# Patient Record
Sex: FEMALE | Race: WHITE | NOT HISPANIC OR LATINO | Employment: UNEMPLOYED | URBAN - NONMETROPOLITAN AREA
[De-identification: names, ages, dates, MRNs, and addresses within clinical notes are randomized per-mention and may not be internally consistent; named-entity substitution may affect disease eponyms.]

---

## 2017-02-06 ENCOUNTER — OFFICE VISIT (OUTPATIENT)
Dept: OBSTETRICS AND GYNECOLOGY | Facility: CLINIC | Age: 47
End: 2017-02-06

## 2017-02-06 VITALS
WEIGHT: 133 LBS | HEIGHT: 65 IN | DIASTOLIC BLOOD PRESSURE: 70 MMHG | SYSTOLIC BLOOD PRESSURE: 100 MMHG | BODY MASS INDEX: 22.16 KG/M2

## 2017-02-06 DIAGNOSIS — J30.89 ENVIRONMENTAL AND SEASONAL ALLERGIES: ICD-10-CM

## 2017-02-06 DIAGNOSIS — Z01.419 VISIT FOR GYNECOLOGIC EXAMINATION: Primary | ICD-10-CM

## 2017-02-06 PROCEDURE — 99396 PREV VISIT EST AGE 40-64: CPT | Performed by: NURSE PRACTITIONER

## 2017-02-06 RX ORDER — AMOXICILLIN 500 MG/1
500 CAPSULE ORAL 2 TIMES DAILY
Qty: 20 CAPSULE | Refills: 0 | Status: SHIPPED | OUTPATIENT
Start: 2017-02-06 | End: 2017-02-16

## 2017-02-06 NOTE — PROGRESS NOTES
Subjective   Alanna Sun is a 46 y.o. female is here today as a self referral.    HPI Comments: I'm here to have a physical and to see if I have the flu.    Gynecologic Exam   Associated symptoms include chills, a fever and a sore throat.       The following portions of the patient's history were reviewed and updated as appropriate: allergies, current medications, past family history, past medical history, past surgical history and problem list.    Review of Systems   Constitutional: Positive for chills, fatigue and fever.   HENT: Positive for sinus pressure and sore throat.    Musculoskeletal: Positive for arthralgias.       Objective   Physical Exam   Constitutional: She is oriented to person, place, and time. She appears well-developed and well-nourished.   HENT:   Head: Normocephalic.   Right Ear: External ear normal. There is drainage.   Left Ear: External ear normal. There is drainage.   Nose: Nose normal.   Mouth/Throat: Posterior oropharyngeal erythema present.   Eyes: Conjunctivae are normal. Pupils are equal, round, and reactive to light.   Neck: Normal range of motion. Neck supple. Carotid bruit is not present. No thyromegaly present.   Cardiovascular: Regular rhythm, normal heart sounds and intact distal pulses.    No murmur heard.  Pulmonary/Chest: Effort normal and breath sounds normal. No respiratory distress. Right breast exhibits no inverted nipple, no mass, no nipple discharge, no skin change and no tenderness. Left breast exhibits no inverted nipple, no mass, no nipple discharge, no skin change and no tenderness. Breasts are symmetrical. There is no breast swelling.   Abdominal: Soft. Bowel sounds are normal. She exhibits no distension and no mass. There is no tenderness. There is no guarding. No hernia. Hernia confirmed negative in the right inguinal area and confirmed negative in the left inguinal area.   Genitourinary: Vagina normal. Rectal exam shows no external hemorrhoid, no internal  hemorrhoid, no fissure, no mass, no tenderness and anal tone normal. No breast tenderness, discharge or bleeding. There is no rash, tenderness, lesion or injury on the right labia. There is no rash, tenderness, lesion or injury on the left labia. No vaginal discharge found.   Genitourinary Comments: Cervix, Uterus and Adnexa are surgically absent.  Urethra and urethral meatus normal  Bladder, normal no prolapse  Perineum and anus examined and without lesions   Musculoskeletal: Normal range of motion. She exhibits no edema or tenderness.   Lymphadenopathy:        Head (right side): No submental, no submandibular, no tonsillar, no preauricular, no posterior auricular and no occipital adenopathy present.        Head (left side): No submental, no submandibular, no tonsillar, no preauricular, no posterior auricular and no occipital adenopathy present.     She has no cervical adenopathy.        Right cervical: No superficial cervical, no deep cervical and no posterior cervical adenopathy present.       Left cervical: No superficial cervical, no deep cervical and no posterior cervical adenopathy present.     She has no axillary adenopathy.        Right: No inguinal adenopathy present.        Left: No inguinal adenopathy present.   Neurological: She is alert and oriented to person, place, and time. Coordination normal.   Skin: Skin is warm and dry. No bruising and no rash noted. No erythema.   Psychiatric: She has a normal mood and affect. Her behavior is normal. Judgment and thought content normal.   Nursing note and vitals reviewed.        Assessment/Plan   Alanna was seen today for gynecologic exam.    Diagnoses and all orders for this visit:    Visit for gynecologic examination   Normal gyn exam. Pt is shara and they have no phone. She will let me know about a phone number and then have a mammorgam  Scheduled. But the Imaging center needs a phone to set up the appointment.     Environmental and seasonal allergies    Pt  thinks she has the flu but no temp today 98.9. Discussed with her that we had no way of doing a flu test but she does have  A lot of allergy symptoms. She will try zyrtec for several months for this.   -     amoxicillin (AMOXIL) 500 MG capsule; Take 1 capsule by mouth 2 (Two) Times a Day for 10 days.    Body mass index (BMI) 22.0-22.9, adult    She is very active physically and they eat what they grow.

## 2017-02-06 NOTE — PATIENT INSTRUCTIONS
Bone Health  Bones protect organs, store calcium, and anchor muscles. Good health habits, such as eating nutritious foods and exercising regularly, are important for maintaining healthy bones. They can also help to prevent a condition that causes bones to lose density and become weak and brittle (osteoporosis).  WHY IS BONE MASS IMPORTANT?  Bone mass refers to the amount of bone tissue that you have. The higher your bone mass, the stronger your bones. An important step toward having healthy bones throughout life is to have strong and dense bones during childhood. A young adult who has a high bone mass is more likely to have a high bone mass later in life. Bone mass at its greatest it is called peak bone mass.  A large decline in bone mass occurs in older adults. In women, it occurs about the time of menopause. During this time, it is important to practice good health habits, because if more bone is lost than what is replaced, the bones will become less healthy and more likely to break (fracture). If you find that you have a low bone mass, you may be able to prevent osteoporosis or further bone loss by changing your diet and lifestyle.  HOW CAN I FIND OUT IF MY BONE MASS IS LOW?  Bone mass can be measured with an X-ray test that is called a bone mineral density (BMD) test. This test is recommended for all women who are age 65 or older. It may also be recommended for men who are age 70 or older, or for people who are more likely to develop osteoporosis due to:  · Having bones that break easily.  · Having a long-term disease that weakens bones, such as kidney disease or rheumatoid arthritis.  · Having menopause earlier than normal.  · Taking medicine that weakens bones, such as steroids, thyroid hormones, or hormone treatment for breast cancer or prostate cancer.  · Smoking.  · Drinking three or more alcoholic drinks each day.  WHAT ARE THE NUTRITIONAL RECOMMENDATIONS FOR HEALTHY BONES?  To have healthy bones, you need  to get enough of the right minerals and vitamins. Most nutrition experts recommend getting these nutrients from the foods that you eat. Nutritional recommendations vary from person to person. Ask your health care provider what is healthy for you. Here are some general guidelines.  Calcium Recommendations  Calcium is the most important (essential) mineral for bone health. Most people can get enough calcium from their diet, but supplements may be recommended for people who are at risk for osteoporosis. Good sources of calcium include:  · Dairy products, such as low-fat or nonfat milk, cheese, and yogurt.  · Dark green leafy vegetables, such as bok vanessa and broccoli.  · Calcium-fortified foods, such as orange juice, cereal, bread, soy beverages, and tofu products.  · Nuts, such as almonds.  Follow these recommended amounts for daily calcium intake:  · Children, age 1-3: 700 mg.  · Children, age 4-8: 1,000 mg.  · Children, age 9-13: 1,300 mg.  · Teens, age 14-18: 1,300 mg.  · Adults, age 19-50: 1,000 mg.  · Adults, age 51-70:    Men: 1,000 mg.    Women: 1,200 mg.  · Adults, age 71 or older: 1,200 mg.  · Pregnant and breastfeeding females:    Teens: 1,300 mg.    Adults: 1,000 mg.  Vitamin D Recommendations  Vitamin D is the most essential vitamin for bone health. It helps the body to absorb calcium. Sunlight stimulates the skin to make vitamin D, so be sure to get enough sunlight. If you live in a cold climate or you do not get outside often, your health care provider may recommend that you take vitamin D supplements. Good sources of vitamin D in your diet include:  · Egg yolks.  · Saltwater fish.  · Milk and cereal fortified with vitamin D.  Follow these recommended amounts for daily vitamin D intake:  · Children and teens, age 1-18: 600 international units.  · Adults, age 50 or younger: 400-800 international units.  · Adults, age 51 or older: 800-1,000 international units.  Other Nutrients  Other nutrients for bone  health include:  · Phosphorus. This mineral is found in meat, poultry, dairy foods, nuts, and legumes. The recommended daily intake for adult men and adult women is 700 mg.  · Magnesium. This mineral is found in seeds, nuts, dark green vegetables, and legumes. The recommended daily intake for adult men is 400-420 mg. For adult women, it is 310-320 mg.  · Vitamin K. This vitamin is found in green leafy vegetables. The recommended daily intake is 120 mg for adult men and 90 mg for adult women.  WHAT TYPE OF PHYSICAL ACTIVITY IS BEST FOR BUILDING AND MAINTAINING HEALTHY BONES?  Weight-bearing and strength-building activities are important for building and maintaining peak bone mass. Weight-bearing activities cause muscles and bones to work against gravity. Strength-building activities increases muscle strength that supports bones. Weight-bearing and muscle-building activities include:  · Walking and hiking.  · Jogging and running.  · Dancing.  · Gym exercises.  · Lifting weights.  · Tennis and racquetball.  · Climbing stairs.  · Aerobics.  Adults should get at least 30 minutes of moderate physical activity on most days. Children should get at least 60 minutes of moderate physical activity on most days. Ask your health care provide what type of exercise is best for you.  WHERE CAN I FIND MORE INFORMATION?  For more information, check out the following websites:  · National Osteoporosis Foundation: http://nof.org/learn/basics  · National Institutes of Health: http://www.niams.nih.gov/Health_Info/Bone/Bone_Health/bone_health_for_life.asp     This information is not intended to replace advice given to you by your health care provider. Make sure you discuss any questions you have with your health care provider.     Document Released: 03/09/2005 Document Revised: 05/03/2016 Document Reviewed: 12/23/2015  [employer] Interactive Patient Education ©2016 [employer]

## 2018-03-01 ENCOUNTER — OFFICE VISIT (OUTPATIENT)
Dept: OBSTETRICS AND GYNECOLOGY | Facility: CLINIC | Age: 48
End: 2018-03-01

## 2018-03-01 VITALS
DIASTOLIC BLOOD PRESSURE: 78 MMHG | SYSTOLIC BLOOD PRESSURE: 132 MMHG | BODY MASS INDEX: 23.66 KG/M2 | WEIGHT: 142 LBS | HEIGHT: 65 IN

## 2018-03-01 DIAGNOSIS — Z01.419 WELL WOMAN EXAM WITH ROUTINE GYNECOLOGICAL EXAM: Primary | ICD-10-CM

## 2018-03-01 PROCEDURE — 99396 PREV VISIT EST AGE 40-64: CPT | Performed by: NURSE PRACTITIONER

## 2018-03-01 NOTE — PROGRESS NOTES
"Alanna Sun is a 47 y.o.      Chief Complaint   Patient presents with   • Gynecologic Exam     Pt here for yearly c/o having prob with bladder.           HPI - Patient is in for annual exam.. She had a hysterectomy [facility], left ovary had previously been removed when had C/section.  Patient takes some calcium and takes Vit. D.  Patient states she has never had an abnormal pap. Patient has not taken oral HRT.  She is having some urge incontinence, not very often . There is no breast cancer in immediate family.  Her father has melanoma.  There is no known colon cancer in her family.      The following portions of the patient's history were reviewed and updated as appropriate:vital signs, allergies, current medications, past family history, past medical history, past social history, past surgical history and problem list.      Current Outpatient Prescriptions:   •  estradiol (ESTRACE VAGINAL) 0.1 MG/GM vaginal cream, Insert 1 g into the vagina 2 (Two) Times a Week., Disp: 6 each, Rfl: 0    Review of Systems   Constitutional: Negative for chills, fatigue and fever.   HENT: Negative for ear pain, sinus pressure and sore throat.    Respiratory: Negative for apnea, cough and stridor.    Gastrointestinal: Negative for abdominal distention, blood in stool and nausea.   Genitourinary: Negative for dyspareunia and flank pain.        Palak and urge incontinence   Musculoskeletal: Positive for arthralgias.   Neurological: Negative for dizziness, tremors and light-headedness.   Psychiatric/Behavioral: Negative for agitation and confusion. The patient is not hyperactive.      Breast ROS: negative    Objective      /78  Ht 165.1 cm (65\")  Wt 64.4 kg (142 lb)  LMP 2016 (Approximate)  BMI 23.63 kg/m2      Physical Exam   Constitutional: She is oriented to person, place, and time. She appears well-developed and well-nourished.   HENT:   Head: Normocephalic and atraumatic.   Eyes: Conjunctivae and EOM are " normal. Right eye exhibits no discharge. Left eye exhibits no discharge. No scleral icterus.   Neck: Normal range of motion. Neck supple. Carotid bruit is not present. No thyromegaly present.   Cardiovascular: Normal rate.    No murmur heard.  Pulmonary/Chest: Effort normal and breath sounds normal. No respiratory distress. Right breast exhibits no inverted nipple, no mass, no nipple discharge, no skin change and no tenderness. Left breast exhibits no inverted nipple, no mass, no nipple discharge, no skin change and no tenderness. Breasts are symmetrical. There is no breast swelling.   Abdominal: Soft. Bowel sounds are normal. She exhibits no distension and no mass. There is no tenderness. There is no guarding. No hernia. Hernia confirmed negative in the right inguinal area and confirmed negative in the left inguinal area.   Genitourinary: Vagina normal. Rectal exam shows no mass. No breast tenderness, discharge or bleeding. There is no rash, tenderness, lesion or injury on the right labia. There is no rash, tenderness, lesion or injury on the left labia. Right adnexum displays no mass and no tenderness. Left adnexum displays no mass and no tenderness. No erythema or bleeding in the vagina. No signs of injury around the vagina. No vaginal discharge found.   Genitourinary Comments: Cervix, Uterus and Adnexa are surgically absent.  Urethra and urethral meatus normal  Bladder, mild prolaps  Perineum and anus examined and without lesions   Musculoskeletal: Normal range of motion. She exhibits no edema or tenderness.   Lymphadenopathy:        Head (right side): No submental, no submandibular, no tonsillar, no preauricular, no posterior auricular and no occipital adenopathy present.        Head (left side): No submental, no submandibular, no tonsillar, no preauricular, no posterior auricular and no occipital adenopathy present.     She has no cervical adenopathy.        Right cervical: No superficial cervical, no deep  cervical and no posterior cervical adenopathy present.       Left cervical: No superficial cervical, no deep cervical and no posterior cervical adenopathy present.     She has no axillary adenopathy.        Right: No inguinal adenopathy present.        Left: No inguinal adenopathy present.   Neurological: She is alert and oriented to person, place, and time. She exhibits normal muscle tone. Coordination normal.   Skin: Skin is warm and dry. No bruising and no rash noted. No erythema.   Psychiatric: She has a normal mood and affect. Her behavior is normal. Judgment and thought content normal.   Nursing note and vitals reviewed.       Assessment/Plan     ASSESSMENT/PLAN    Alanna was seen today for gynecologic exam.    Diagnoses and all orders for this visit:    Well woman exam with routine gynecological exam    Patient is counseled re: Kegel exercises and if the urge incontinence worsens, medication can be given.  Alanna has never had a mammogram and she is counseled re: the importance of this.  She is going to check prices as she has no insurance.  I also gave her the number of the Health Dept.    Encouraged exercise, Vit D3, calcium and BSE.          Natasha Burns, APRN  3/1/2018

## 2018-03-06 ENCOUNTER — TELEPHONE (OUTPATIENT)
Dept: OBSTETRICS AND GYNECOLOGY | Facility: CLINIC | Age: 48
End: 2018-03-06

## 2018-03-07 DIAGNOSIS — Z12.31 ENCOUNTER FOR SCREENING MAMMOGRAM FOR MALIGNANT NEOPLASM OF BREAST: Primary | ICD-10-CM

## 2018-03-13 DIAGNOSIS — Z12.31 ENCOUNTER FOR SCREENING MAMMOGRAM FOR MALIGNANT NEOPLASM OF BREAST: ICD-10-CM
